# Patient Record
Sex: FEMALE | Race: WHITE | NOT HISPANIC OR LATINO | Employment: UNEMPLOYED | URBAN - METROPOLITAN AREA
[De-identification: names, ages, dates, MRNs, and addresses within clinical notes are randomized per-mention and may not be internally consistent; named-entity substitution may affect disease eponyms.]

---

## 2023-06-28 ENCOUNTER — TELEPHONE (OUTPATIENT)
Dept: PRIMARY CARE | Facility: CLINIC | Age: 53
End: 2023-06-28
Payer: COMMERCIAL

## 2023-10-05 NOTE — PROGRESS NOTES
Subjective   Maude Austin is a 52 y.o. female who presents for new to me.  HPI  52-year-old female new to me patient patient with known history of hypertension vaginal dryness patient is here for follow-up for major medical denies chest pain shortness of breath fever chills nausea vomiting constipation diarrhea discharge or fracture.  Review of Systems  10 system review pertinent as above  Objective     Visit Vitals  /82   Pulse 78   Temp 36.3 °C (97.3 °F)   Resp 15      Physical Exam    HEENT: Atraumatic normocephalic the pupils are equal and round and reactive to light the sclerae nonicteric extraocular motion are intact.  Neck: Is supple without JVD no carotid bruits the trachea is midline there are no masses pulses are equal and bilateral with normal upstroke.  Skin: Normal.  Skin good texture.  Moist.  Good turgor.  No lesions, no rashes.  Lymph: No lymphadenopathy appreciated, no masses, no lesions  Lungs: Are clear to auscultation and percussion, good breath sounds bilaterally, no rhonchi, no wheezing, good diaphragmatic excursion.  Heart: Normal rate and normal rhythm S1, S2, no S3, no gallop, murmur or rub.  Abdomen: Soft, nontender, no organomegaly, good bowel sounds.    Extremities: Full range of motion, good pulses bilateral.  No cyanosis, no clubbing or edema.  Neuro: Cranial nerves II-XII are grossly intact there is no sensory or motor deficits.  Able to move all extremities.    Assessment/Plan     Personally reviewed medical records brought in by patient  Include but not limited to the blood work and radiology report.    Here for follow-up fasting blood work  To become established as a new patient    CBC BMP lipids AST ALT vitamin 25-hydroxy    Prevention    Colonoscopy 12/2022 Dr CANTU  DEXA osteoporosis 11/28/2022  Mammogram 05/05/2023 Dr Nadege gomez    Follow-up with GYN    Immunization    Flu vaccine  Pneumonia vaccine: Vaccine    Herpes labialis Oral  Valacyclir from time to  time    Elevated Cholesterol   Elevated LDL  Father 75 CVA age 65, no CAD  Mother passed 74 CAD stent age 74 was a heavy smoker  One sister 53 not sure  One brother age 47 overweight doesn't know   We will obtain a cardiac calcium score  Based on results we will discuss further treatment    Gyn Dr Nadege Blanco    Functional medicine Dr Davis   A1c  HS CRP  CMP   Serum insulin Homocystin    Osteoporosis  Bone density done at the [facility]  -2.5 spine  We discussed treatment  Patient follows with Dr. Yaneth Davis  Recommended    Elevated BMI  28.08 kg meter square  Spoke about diet exercise weight loss    History of vaginal dryness  On estrogen replacement  Topical replacement  Has a GYN    Problem List Items Addressed This Visit       Elevated BP without diagnosis of hypertension    Relevant Orders    Basic Metabolic Panel    Osteoporosis     Other Visit Diagnoses       Dyslipidemia    -  Primary    Relevant Orders    CT cardiac scoring wo IV contrast    Lipid Panel    AST    ALT    Physical exam        Relevant Orders    CBC    High sensitivity CRP    Insulin, Fasting    Homocysteine, serum    TSH    Hemoglobin A1C              Storm Bishop MD

## 2023-10-09 ENCOUNTER — OFFICE VISIT (OUTPATIENT)
Dept: PRIMARY CARE | Facility: CLINIC | Age: 53
End: 2023-10-09
Payer: COMMERCIAL

## 2023-10-09 VITALS
WEIGHT: 174 LBS | HEIGHT: 66 IN | RESPIRATION RATE: 15 BRPM | BODY MASS INDEX: 27.97 KG/M2 | SYSTOLIC BLOOD PRESSURE: 120 MMHG | TEMPERATURE: 97.3 F | DIASTOLIC BLOOD PRESSURE: 82 MMHG | HEART RATE: 78 BPM

## 2023-10-09 DIAGNOSIS — R03.0 ELEVATED BP WITHOUT DIAGNOSIS OF HYPERTENSION: ICD-10-CM

## 2023-10-09 DIAGNOSIS — Z00.00 PHYSICAL EXAM: ICD-10-CM

## 2023-10-09 DIAGNOSIS — E78.5 DYSLIPIDEMIA: Primary | ICD-10-CM

## 2023-10-09 DIAGNOSIS — M81.0 OSTEOPOROSIS, UNSPECIFIED OSTEOPOROSIS TYPE, UNSPECIFIED PATHOLOGICAL FRACTURE PRESENCE: ICD-10-CM

## 2023-10-09 PROCEDURE — 84443 ASSAY THYROID STIM HORMONE: CPT | Performed by: INTERNAL MEDICINE

## 2023-10-09 PROCEDURE — 85025 COMPLETE CBC W/AUTO DIFF WBC: CPT | Performed by: INTERNAL MEDICINE

## 2023-10-09 PROCEDURE — 80048 BASIC METABOLIC PNL TOTAL CA: CPT | Performed by: INTERNAL MEDICINE

## 2023-10-09 PROCEDURE — 83036 HEMOGLOBIN GLYCOSYLATED A1C: CPT | Performed by: INTERNAL MEDICINE

## 2023-10-09 PROCEDURE — 99215 OFFICE O/P EST HI 40 MIN: CPT | Performed by: INTERNAL MEDICINE

## 2023-10-09 PROCEDURE — 36415 COLL VENOUS BLD VENIPUNCTURE: CPT

## 2023-10-09 PROCEDURE — 84450 TRANSFERASE (AST) (SGOT): CPT | Performed by: INTERNAL MEDICINE

## 2023-10-09 PROCEDURE — 86141 C-REACTIVE PROTEIN HS: CPT

## 2023-10-09 PROCEDURE — 80061 LIPID PANEL: CPT | Performed by: INTERNAL MEDICINE

## 2023-10-09 PROCEDURE — 84460 ALANINE AMINO (ALT) (SGPT): CPT | Performed by: INTERNAL MEDICINE

## 2023-10-09 PROCEDURE — 83090 ASSAY OF HOMOCYSTEINE: CPT

## 2023-10-09 PROCEDURE — 83525 ASSAY OF INSULIN: CPT

## 2023-10-09 RX ORDER — PRASTERONE 6.5 MG/1
INSERT VAGINAL
COMMUNITY
Start: 2022-06-01

## 2023-10-09 RX ORDER — PROGESTERONE 100 MG/1
1 CAPSULE ORAL NIGHTLY
COMMUNITY
Start: 2022-04-01

## 2023-10-09 RX ORDER — CHOLECALCIFEROL (VITAMIN D3) 125 MCG
5000 CAPSULE ORAL
COMMUNITY
Start: 2022-08-26

## 2023-10-09 RX ORDER — VALACYCLOVIR HYDROCHLORIDE 1 G/1
TABLET, FILM COATED ORAL
COMMUNITY
Start: 2022-03-03

## 2023-10-09 RX ORDER — CALCIUM CARBONATE 300MG(750)
1 TABLET,CHEWABLE ORAL NIGHTLY
COMMUNITY
Start: 2021-08-27

## 2023-10-09 RX ORDER — ESTRADIOL AND NORETHINDRONE ACETATE 1; .5 MG/1; MG/1
1 TABLET ORAL DAILY
COMMUNITY
Start: 2023-07-12

## 2023-10-09 ASSESSMENT — PAIN SCALES - GENERAL: PAINLEVEL: 0-NO PAIN

## 2023-10-10 LAB
CRP SERPL HS-MCNC: 2.7 MG/L
INSULIN P FAST SERPL-ACNC: 5 UIU/ML (ref 3–25)

## 2023-10-11 DIAGNOSIS — R79.89 HIGH PLASMA HOMOCYSTINE: Primary | ICD-10-CM

## 2023-10-11 LAB — HCYS SERPL-SCNC: 16.91 UMOL/L (ref 5–13.9)

## 2023-11-08 ENCOUNTER — HOSPITAL ENCOUNTER (OUTPATIENT)
Dept: RADIOLOGY | Facility: HOSPITAL | Age: 53
Discharge: HOME | End: 2023-11-08
Payer: COMMERCIAL

## 2023-11-08 ENCOUNTER — LAB (OUTPATIENT)
Dept: LAB | Facility: LAB | Age: 53
End: 2023-11-08
Payer: COMMERCIAL

## 2023-11-08 DIAGNOSIS — E78.5 DYSLIPIDEMIA: ICD-10-CM

## 2023-11-08 DIAGNOSIS — R79.89 HIGH PLASMA HOMOCYSTINE: ICD-10-CM

## 2023-11-08 LAB
FOLATE SERPL-MCNC: 13.5 NG/ML
VIT B12 SERPL-MCNC: 322 PG/ML (ref 211–911)

## 2023-11-08 PROCEDURE — 75571 CT HRT W/O DYE W/CA TEST: CPT

## 2023-11-08 PROCEDURE — 82746 ASSAY OF FOLIC ACID SERUM: CPT

## 2023-11-08 PROCEDURE — 82607 VITAMIN B-12: CPT

## 2023-11-08 PROCEDURE — 36415 COLL VENOUS BLD VENIPUNCTURE: CPT

## 2023-12-08 ENCOUNTER — TELEPHONE (OUTPATIENT)
Dept: PRIMARY CARE | Facility: CLINIC | Age: 53
End: 2023-12-08
Payer: COMMERCIAL

## 2023-12-08 DIAGNOSIS — M81.0 OSTEOPOROSIS, UNSPECIFIED OSTEOPOROSIS TYPE, UNSPECIFIED PATHOLOGICAL FRACTURE PRESENCE: ICD-10-CM

## 2023-12-08 DIAGNOSIS — Z00.00 PHYSICAL EXAM: ICD-10-CM

## 2023-12-08 DIAGNOSIS — E53.8 B12 DEFICIENCY: ICD-10-CM

## 2023-12-08 DIAGNOSIS — R03.0 ELEVATED BP WITHOUT DIAGNOSIS OF HYPERTENSION: Primary | ICD-10-CM

## 2024-07-01 DIAGNOSIS — L23.7 POISON IVY DERMATITIS: Primary | ICD-10-CM

## 2024-07-01 RX ORDER — TRIAMCINOLONE ACETONIDE 1 MG/G
CREAM TOPICAL 2 TIMES DAILY
Qty: 30 G | Refills: 0 | Status: SHIPPED | OUTPATIENT
Start: 2024-07-01

## 2024-07-01 RX ORDER — PREDNISONE 20 MG/1
40 TABLET ORAL DAILY
Qty: 10 TABLET | Refills: 0 | Status: SHIPPED | OUTPATIENT
Start: 2024-07-01 | End: 2024-07-06

## 2024-07-01 NOTE — PROGRESS NOTES
Poison ivy, prednisone 40 mg once a day for 5 days, triamcinolone cream to affected area avoid face and groin.

## 2024-11-30 NOTE — PROGRESS NOTES
Subjective   Maude Austin is a 53 y.o. female who presents for  fasting today physical.  HPI  53-year-old female new to me patient patient with known history of hypertension vaginal dryness patient is here for follow-up for major medical denies chest pain shortness of breath fever chills nausea vomiting constipation diarrhea discharge or fracture.  Review of Systems  10 system review pertinent as above  Objective     Visit Vitals  /82   Pulse 74   Temp 36.5 °C (97.7 °F)   Resp 16        Physical Exam    HEENT: Atraumatic normocephalic the pupils are equal and round and reactive to light the sclerae nonicteric extraocular motion are intact.  Neck: Is supple without JVD no carotid bruits the trachea is midline there are no masses pulses are equal and bilateral with normal upstroke.  Skin: Normal.  Skin good texture.  Moist.  Good turgor.  No lesions, no rashes.  Lymph: No lymphadenopathy appreciated, no masses, no lesions  Lungs: Are clear to auscultation and percussion, good breath sounds bilaterally, no rhonchi, no wheezing, good diaphragmatic excursion.  Heart: Normal rate and normal rhythm S1, S2, no S3, no gallop, murmur or rub.  Abdomen: Soft, nontender, no organomegaly, good bowel sounds.    Extremities: Full range of motion, good pulses bilateral.  No cyanosis, no clubbing or edema.  Neuro: Cranial nerves II-XII are grossly intact there is no sensory or motor deficits.  Able to move all extremities.    Assessment/Plan     Personally reviewed medical records brought in by patient  Include but not limited to the blood work and radiology report.    Here for follow-up fasting blood work  To become established as a new patient    CBC BMP lipids AST ALT vitamin 25-hydroxy    Prevention    Colonoscopy 12/2022 Dr RennerDEXEMEKA osteoporosis 11/28/2022 and dec 2 2024 osteopenia improving Vit D Kole and exercise  Mammogram 05/05/2023 ,07/01/2024 Dr Nadege gomez    Follow-up with GYN    Immunization    Flu vaccine done  "09/28/2024 [facility]  Pneumonia vaccine: Vaccine   RSV age 75    CDC Recommendation  \"CDC recommends a one-time-dose of RSV vaccine for everyone 75 years and older and for adults 60 through 74 years of age who are at increased risk of severe RSV disease. Adults 60 through 74 years old who are at increased risk include those with chronic heart or lung disease, a weakened immune system, or certain other chronic medical conditions, and those who are residents of nursing home \"    [place] vaccine declined    Herpes labialis Oral  Valacyclir from time to time    Elevated Cholesterol   Elevated  2023 Dec with Zero CACS 2023  Father 75 CVA age 65, no CAD  Mother passed 74 CAD stent age 74 was a heavy smoker  One sister 53 not sure  One brother age 47 overweight doesn't know   cardiac calcium score above    Cont to follow   Gyn Dr Nadege Blanco    Functional medicine Dr Davis   Follows regularly    Osteopenia with improving  Bone density      Elevated BMI  26.79 kg meter square  Spoke about diet exercise weight loss    History of vaginal dryness  On estrogen replacement  Topical replacement  Has a GYN    Problem List Items Addressed This Visit       Elevated BP without diagnosis of hypertension - Primary    Relevant Orders    Basic Metabolic Panel    Osteoporosis    Relevant Orders    CBC w/5 Part Differential, [facility]    Physical exam    Relevant Orders    CBC w/5 Part Differential, [facility]    Basic Metabolic Panel    Lipid Panel    AST    ALT    Vitamin D 25-Hydroxy,Total (for eval of Vitamin D levels)     Other Visit Diagnoses       Dyslipidemia        Relevant Orders    Lipid Panel    AST    ALT    Vitamin D insufficiency        Relevant Orders    CBC w/5 Part Differential, [facility]    Vitamin D 25-Hydroxy,Total (for eval of Vitamin D levels)                Storm Bishop MD     "

## 2024-12-01 ASSESSMENT — PROMIS GLOBAL HEALTH SCALE
RATE_QUALITY_OF_LIFE: EXCELLENT
RATE_AVERAGE_FATIGUE: MILD
CARRYOUT_PHYSICAL_ACTIVITIES: COMPLETELY
CARRYOUT_SOCIAL_ACTIVITIES: EXCELLENT
RATE_GENERAL_HEALTH: VERY GOOD
RATE_MENTAL_HEALTH: EXCELLENT
RATE_AVERAGE_PAIN: 1
EMOTIONAL_PROBLEMS: RARELY
RATE_SOCIAL_SATISFACTION: EXCELLENT
RATE_PHYSICAL_HEALTH: VERY GOOD

## 2024-12-03 ENCOUNTER — APPOINTMENT (OUTPATIENT)
Dept: PRIMARY CARE | Facility: CLINIC | Age: 54
End: 2024-12-03
Payer: COMMERCIAL

## 2024-12-03 VITALS
TEMPERATURE: 97.7 F | WEIGHT: 166 LBS | HEART RATE: 74 BPM | HEIGHT: 66 IN | BODY MASS INDEX: 26.68 KG/M2 | SYSTOLIC BLOOD PRESSURE: 120 MMHG | RESPIRATION RATE: 16 BRPM | DIASTOLIC BLOOD PRESSURE: 82 MMHG

## 2024-12-03 DIAGNOSIS — E78.5 DYSLIPIDEMIA: ICD-10-CM

## 2024-12-03 DIAGNOSIS — R03.0 ELEVATED BP WITHOUT DIAGNOSIS OF HYPERTENSION: Primary | ICD-10-CM

## 2024-12-03 DIAGNOSIS — M81.0 OSTEOPOROSIS, UNSPECIFIED OSTEOPOROSIS TYPE, UNSPECIFIED PATHOLOGICAL FRACTURE PRESENCE: ICD-10-CM

## 2024-12-03 DIAGNOSIS — Z00.00 PHYSICAL EXAM: ICD-10-CM

## 2024-12-03 DIAGNOSIS — E55.9 VITAMIN D INSUFFICIENCY: ICD-10-CM

## 2024-12-03 LAB
25(OH)D3 SERPL-MCNC: 81 NG/ML (ref 30–100)
ALT SERPL W P-5'-P-CCNC: 43 U/L (ref 16–63)
ANION GAP SERPL CALC-SCNC: 14 MMOL/L (ref 10–20)
AST SERPL W P-5'-P-CCNC: 25 U/L (ref 15–37)
BASOPHILS # BLD AUTO: 0.02 X10*3/UL (ref 0.1–1.6)
BASOPHILS NFR BLD AUTO: 0.31 % (ref 0–0.3)
BUN SERPL-MCNC: 19 MG/DL (ref 7–18)
CALCIUM SERPL-MCNC: 9.1 MG/DL (ref 8.5–10.1)
CHLORIDE SERPL-SCNC: 99 MMOL/L (ref 98–107)
CHOLEST SERPL-MCNC: 197 MG/DL (ref 0–199)
CHOLESTEROL/HDL RATIO: 2.7 (ref 4.2–7)
CO2 SERPL-SCNC: 30 MMOL/L (ref 21–32)
CREAT SERPL-MCNC: 0.81 MG/DL (ref 0.6–1.1)
EGFRCR SERPLBLD CKD-EPI 2021: 86 ML/MIN/1.73M*2
EOSINOPHIL # BLD AUTO: 0.13 X10*3/UL (ref 0.04–0.5)
EOSINOPHIL NFR BLD AUTO: 1.81 % (ref 0.7–7)
ERYTHROCYTE [DISTWIDTH] IN BLOOD BY AUTOMATED COUNT: 13.6 % (ref 11.5–14.5)
GLUCOSE SERPL-MCNC: 82 MG/DL (ref 74–100)
HCT VFR BLD AUTO: 38.6 % (ref 36.6–46.6)
HDLC SERPL-MCNC: 73 MG/DL (ref 40–59)
HGB BLD-MCNC: 12.99 G/DL (ref 12–15.4)
IS PATIENT FASTING: YES
LDLC SERPL DIRECT ASSAY-MCNC: 101 MG/DL (ref 0–100)
LYMPHOCYTES # BLD AUTO: 2.34 X10*3/UL (ref 0–6)
LYMPHOCYTES NFR BLD AUTO: 32.7 % (ref 20.5–51.1)
MCH RBC QN AUTO: 31.1 PG (ref 26–32)
MCHC RBC AUTO-ENTMCNC: 33.7 G/DL (ref 31–38)
MCV RBC AUTO: 92.3 FL (ref 80–96)
MONOCYTES # BLD AUTO: 0.34 X10*3/UL (ref 1.6–24.9)
MONOCYTES NFR BLD AUTO: 4.78 % (ref 1.7–9.3)
NEUTROPHILS # BLD AUTO: 4.32 X10*3/UL (ref 1.4–6.5)
NEUTROPHILS NFR BLD AUTO: 60.4 % (ref 42.2–75.2)
PLATELET # BLD AUTO: 312.2 X10*3/UL (ref 150–450)
PMV BLD AUTO: 9.32 FL (ref 7.8–11)
POTASSIUM SERPL-SCNC: 4.6 MMOL/L (ref 3.5–5.1)
RBC # BLD AUTO: 4.18 X10*6/UL (ref 3.9–5.3)
SODIUM SERPL-SCNC: 138 MMOL/L (ref 136–145)
TRIGL SERPL-MCNC: 87 MG/DL
WBC # BLD AUTO: 7.16 X10*3/UL (ref 4.5–10.5)

## 2024-12-03 PROCEDURE — 82306 VITAMIN D 25 HYDROXY: CPT | Performed by: INTERNAL MEDICINE

## 2024-12-03 PROCEDURE — 99396 PREV VISIT EST AGE 40-64: CPT | Performed by: INTERNAL MEDICINE

## 2024-12-03 PROCEDURE — 85025 COMPLETE CBC W/AUTO DIFF WBC: CPT | Performed by: INTERNAL MEDICINE

## 2024-12-03 PROCEDURE — 84450 TRANSFERASE (AST) (SGOT): CPT | Performed by: INTERNAL MEDICINE

## 2024-12-03 PROCEDURE — 80048 BASIC METABOLIC PNL TOTAL CA: CPT | Performed by: INTERNAL MEDICINE

## 2024-12-03 PROCEDURE — 84460 ALANINE AMINO (ALT) (SGPT): CPT | Performed by: INTERNAL MEDICINE

## 2024-12-03 PROCEDURE — 3008F BODY MASS INDEX DOCD: CPT | Performed by: INTERNAL MEDICINE

## 2024-12-03 PROCEDURE — 80061 LIPID PANEL: CPT | Performed by: INTERNAL MEDICINE

## 2024-12-03 ASSESSMENT — ENCOUNTER SYMPTOMS
OCCASIONAL FEELINGS OF UNSTEADINESS: 0
LOSS OF SENSATION IN FEET: 0
DEPRESSION: 0

## 2024-12-23 DIAGNOSIS — J45.909 ACUTE ASTHMATIC BRONCHITIS (HHS-HCC): Primary | ICD-10-CM

## 2024-12-23 RX ORDER — AZITHROMYCIN 250 MG/1
TABLET, FILM COATED ORAL
Qty: 6 TABLET | Refills: 0 | Status: SHIPPED | OUTPATIENT
Start: 2024-12-23 | End: 2024-12-28

## 2024-12-23 RX ORDER — METHYLPREDNISOLONE 4 MG/1
TABLET ORAL
Qty: 21 TABLET | Refills: 0 | Status: SHIPPED | OUTPATIENT
Start: 2024-12-23 | End: 2024-12-29

## 2024-12-23 RX ORDER — BENZONATATE 200 MG/1
200 CAPSULE ORAL 3 TIMES DAILY PRN
Qty: 42 CAPSULE | Refills: 0 | Status: SHIPPED | OUTPATIENT
Start: 2024-12-23 | End: 2025-01-22